# Patient Record
Sex: MALE | Race: WHITE
[De-identification: names, ages, dates, MRNs, and addresses within clinical notes are randomized per-mention and may not be internally consistent; named-entity substitution may affect disease eponyms.]

---

## 2018-02-01 ENCOUNTER — HOSPITAL ENCOUNTER (EMERGENCY)
Dept: HOSPITAL 74 - JER | Age: 43
Discharge: HOME | End: 2018-02-01
Payer: COMMERCIAL

## 2018-02-01 VITALS — HEART RATE: 109 BPM | TEMPERATURE: 98.6 F | DIASTOLIC BLOOD PRESSURE: 91 MMHG | SYSTOLIC BLOOD PRESSURE: 149 MMHG

## 2018-02-01 VITALS — BODY MASS INDEX: 25.4 KG/M2

## 2018-02-01 DIAGNOSIS — J40: Primary | ICD-10-CM

## 2018-02-01 PROCEDURE — 3E0F7GC INTRODUCTION OF OTHER THERAPEUTIC SUBSTANCE INTO RESPIRATORY TRACT, VIA NATURAL OR ARTIFICIAL OPENING: ICD-10-PCS | Performed by: EMERGENCY MEDICINE

## 2018-02-01 NOTE — PDOC
History of Present Illness





- General


Chief Complaint: Lightheaded


Stated Complaint: DIZZINESS


Time Seen by Provider: 02/01/18 01:47





Past History





- Past Medical History


Allergies/Adverse Reactions: 


 Allergies











Allergy/AdvReac Type Severity Reaction Status Date / Time


 


No Known Allergies Allergy   Verified 02/01/18 03:40











Home Medications: 


Ambulatory Orders





Albuterol Sulfate Inhaler - [Ventolin HFA Inhaler -] 1 - 2 inh PO Q4H #1 

inhaler 02/01/18 


Guaifenesin [Robitussin] 10 ml PO Q6H #200 ml 02/01/18 


Prednisone [Deltasone -] 40 mg PO DAILY #8 tablet 02/01/18 











- Suicide/Smoking/Psychosocial Hx


Smoking History: Never smoked


Have you smoked in the past 12 months: No


Information on smoking cessation initiated: No


Hx Alcohol Use: No


Drug/Substance Use Hx: No





*Physical Exam





- Vital Signs


 Last Vital Signs











Temp Pulse Resp BP Pulse Ox


 


 98.6 F   109 H  20   149/91   99 


 


 02/01/18 00:28  02/01/18 00:28  02/01/18 00:28  02/01/18 00:28  02/01/18 00:28














*DC/Admit/Observation/Transfer


Diagnosis at time of Disposition: 


 Bronchitis








- Discharge Dispostion


Disposition: HOME


Condition at time of disposition: Good


Admit: No





- Referrals


Referrals: 


Dalton Victoria MD [Staff Physician] - 





- Patient Instructions


Printed Discharge Instructions:  DI for Acute Bronchitis


Additional Instructions: 


You have bronchitis. Your chest x-ray today was negative for pneumonia. Please 

take the albuterol inhaler every 4 hours as needed for cough and shortness of 

breath. Please take the prednisone as prescribed, 40 mg for the next 4 days. 

Your also prescribed Robitussin. You may take this every 6 hours as needed for 

cough. Home remedies work well such as tea and cough drops. Please follow-up 

with your primary care doctor this week.





Return to the emergency department if you have worsening shortness of breath, 

difficulty breathing, fevers, or any changes in her symptoms.





- Post Discharge Activity


Forms/Work/School Notes:  Back to Work